# Patient Record
Sex: FEMALE | Race: WHITE | NOT HISPANIC OR LATINO | URBAN - METROPOLITAN AREA
[De-identification: names, ages, dates, MRNs, and addresses within clinical notes are randomized per-mention and may not be internally consistent; named-entity substitution may affect disease eponyms.]

---

## 2023-08-28 PROBLEM — R73.03 PRE-DIABETES: Status: ACTIVE | Noted: 2023-08-28

## 2023-08-28 PROBLEM — E53.8 VITAMIN B12 DEFICIENCY: Status: ACTIVE | Noted: 2023-08-28

## 2023-08-28 PROBLEM — M19.90 OA (OSTEOARTHRITIS): Status: ACTIVE | Noted: 2023-08-28

## 2023-08-28 PROBLEM — I83.812 VARICOSE VEINS OF LEFT LOWER EXTREMITY WITH PAIN: Status: ACTIVE | Noted: 2023-08-28

## 2023-08-28 PROBLEM — E55.9 VITAMIN D DEFICIENCY: Status: ACTIVE | Noted: 2023-08-28

## 2023-08-28 PROBLEM — E03.9 HYPOTHYROIDISM: Status: ACTIVE | Noted: 2023-08-28

## 2023-08-28 PROBLEM — L71.9 ROSACEA: Status: ACTIVE | Noted: 2023-08-28

## 2023-08-28 PROBLEM — H04.123 DRY EYES: Status: ACTIVE | Noted: 2023-08-28

## 2023-08-28 PROBLEM — M81.0 OSTEOPOROSIS: Status: ACTIVE | Noted: 2023-08-28

## 2023-08-28 PROBLEM — K59.00 CONSTIPATION: Status: ACTIVE | Noted: 2023-08-28

## 2023-08-28 RX ORDER — LEVOTHYROXINE SODIUM 100 UG/1
TABLET ORAL EVERY 24 HOURS
COMMUNITY

## 2023-08-28 RX ORDER — POLYETHYLENE GLYCOL 3350 17 G/17G
POWDER, FOR SOLUTION ORAL
COMMUNITY
Start: 2020-09-01

## 2023-08-28 RX ORDER — SEMAGLUTIDE 0.68 MG/ML
INJECTION, SOLUTION SUBCUTANEOUS
COMMUNITY
Start: 2023-03-31 | End: 2023-11-10 | Stop reason: WASHOUT

## 2023-08-28 RX ORDER — DOXYCYCLINE 40 MG/1
CAPSULE ORAL EVERY 24 HOURS
COMMUNITY

## 2023-08-28 RX ORDER — ERGOCALCIFEROL 1.25 MG/1
CAPSULE ORAL
COMMUNITY

## 2023-08-28 RX ORDER — METFORMIN HYDROCHLORIDE 500 MG/1
TABLET, EXTENDED RELEASE ORAL EVERY 24 HOURS
COMMUNITY
Start: 2021-12-22 | End: 2023-11-10 | Stop reason: ALTCHOICE

## 2023-11-10 ENCOUNTER — OFFICE VISIT (OUTPATIENT)
Dept: PRIMARY CARE | Facility: CLINIC | Age: 63
End: 2023-11-10
Payer: COMMERCIAL

## 2023-11-10 VITALS
SYSTOLIC BLOOD PRESSURE: 118 MMHG | HEART RATE: 78 BPM | HEIGHT: 68 IN | WEIGHT: 213 LBS | OXYGEN SATURATION: 96 % | BODY MASS INDEX: 32.28 KG/M2 | DIASTOLIC BLOOD PRESSURE: 72 MMHG

## 2023-11-10 DIAGNOSIS — E55.9 VITAMIN D DEFICIENCY: ICD-10-CM

## 2023-11-10 DIAGNOSIS — M15.9 PRIMARY OSTEOARTHRITIS INVOLVING MULTIPLE JOINTS: ICD-10-CM

## 2023-11-10 DIAGNOSIS — M81.0 AGE-RELATED OSTEOPOROSIS WITHOUT CURRENT PATHOLOGICAL FRACTURE: ICD-10-CM

## 2023-11-10 DIAGNOSIS — L71.9 ROSACEA: ICD-10-CM

## 2023-11-10 DIAGNOSIS — H04.123 DRY EYES: ICD-10-CM

## 2023-11-10 DIAGNOSIS — K59.04 CHRONIC IDIOPATHIC CONSTIPATION: ICD-10-CM

## 2023-11-10 DIAGNOSIS — E53.8 VITAMIN B12 DEFICIENCY: ICD-10-CM

## 2023-11-10 DIAGNOSIS — R73.03 PRE-DIABETES: Primary | ICD-10-CM

## 2023-11-10 DIAGNOSIS — E03.9 ACQUIRED HYPOTHYROIDISM: ICD-10-CM

## 2023-11-10 PROCEDURE — 99214 OFFICE O/P EST MOD 30 MIN: CPT | Performed by: INTERNAL MEDICINE

## 2023-11-10 PROCEDURE — 1036F TOBACCO NON-USER: CPT | Performed by: INTERNAL MEDICINE

## 2023-11-10 RX ORDER — SEMAGLUTIDE 1.34 MG/ML
1 INJECTION, SOLUTION SUBCUTANEOUS
COMMUNITY
Start: 2023-11-06 | End: 2023-11-10 | Stop reason: SDUPTHER

## 2023-11-10 RX ORDER — SEMAGLUTIDE 1.34 MG/ML
1 INJECTION, SOLUTION SUBCUTANEOUS
Qty: 12 ML | Refills: 3 | Status: SHIPPED | OUTPATIENT
Start: 2023-11-10 | End: 2023-12-11 | Stop reason: SDUPTHER

## 2023-11-10 ASSESSMENT — ENCOUNTER SYMPTOMS
DYSURIA: 0
SHORTNESS OF BREATH: 0
ACTIVITY CHANGE: 0
ARTHRALGIAS: 0
DEPRESSION: 0
CONSTITUTIONAL NEGATIVE: 1
DIARRHEA: 0
CARDIOVASCULAR NEGATIVE: 1
COUGH: 0
OCCASIONAL FEELINGS OF UNSTEADINESS: 0
LOSS OF SENSATION IN FEET: 0
ABDOMINAL PAIN: 0
CONSTIPATION: 0
PSYCHIATRIC NEGATIVE: 1

## 2023-11-10 ASSESSMENT — PATIENT HEALTH QUESTIONNAIRE - PHQ9
SUM OF ALL RESPONSES TO PHQ9 QUESTIONS 1 AND 2: 0
1. LITTLE INTEREST OR PLEASURE IN DOING THINGS: NOT AT ALL
2. FEELING DOWN, DEPRESSED OR HOPELESS: NOT AT ALL

## 2023-11-10 ASSESSMENT — LIFESTYLE VARIABLES
HOW OFTEN DO YOU HAVE SIX OR MORE DRINKS ON ONE OCCASION: NEVER
HOW OFTEN DO YOU HAVE A DRINK CONTAINING ALCOHOL: MONTHLY OR LESS

## 2023-11-10 ASSESSMENT — PAIN SCALES - GENERAL: PAINLEVEL: 5

## 2023-11-10 NOTE — PROGRESS NOTES
"Subjective   Patient ID: Felicita Biggs is a 63 y.o. female who presents for 4 MO FUV.    Hyperglycemia-last A1c  5.8  7/2023--taking ozempic--continues to steadily lose weight    Hypothyroid-stable on meds-last TSH nl 3/2023    Rosacea on doxycycline    Vit D and B12 def-on supplements for both    Osteopenia-last DEXA 9/2022-osteopenia         Review of Systems   Constitutional: Negative.  Negative for activity change.        Feeling better with weight loss   Respiratory:  Negative for cough and shortness of breath.    Cardiovascular: Negative.    Gastrointestinal:  Negative for abdominal pain, constipation and diarrhea.   Genitourinary:  Negative for dysuria.   Musculoskeletal:  Negative for arthralgias.        Finds walking much easier   Skin:  Negative for rash.   Psychiatric/Behavioral: Negative.         Objective   /72   Pulse 78   Ht 1.715 m (5' 7.5\")   Wt 96.6 kg (213 lb)   SpO2 96%   BMI 32.87 kg/m²     Physical Exam  Constitutional:       General: She is not in acute distress.     Appearance: Normal appearance.   Cardiovascular:      Rate and Rhythm: Normal rate and regular rhythm.      Heart sounds: Normal heart sounds. No murmur heard.     No gallop.   Pulmonary:      Breath sounds: Normal breath sounds. No wheezing, rhonchi or rales.   Skin:     General: Skin is warm and dry.      Findings: No rash.   Neurological:      General: No focal deficit present.      Mental Status: She is alert and oriented to person, place, and time.   Psychiatric:         Mood and Affect: Mood normal.      Comments: Excited about upcoming trip to [place] with [employer]         Assessment/Plan  doing well, con't regimen and check labs before next visit       Felicita was seen today for 4 mo fuv.  Diagnoses and all orders for this visit:  Pre-diabetes (Primary)  -     Hemoglobin A1C; Future  -     Comprehensive Metabolic Panel; Future  -     Lipid Panel; Future  -     Ozempic 1 mg/dose (4 mg/3 mL) pen injector; Inject 1 mg " under the skin 1 (one) time per week.  Acquired hypothyroidism  -     Thyroid Stimulating Hormone; Future  Age-related osteoporosis without current pathological fracture  Vitamin B12 deficiency  Vitamin D deficiency  Dry eyes  Chronic idiopathic constipation  Primary osteoarthritis involving multiple joints  Rosacea  Other orders  -     Follow Up In Primary Care - Established; Future

## 2023-12-11 DIAGNOSIS — R73.03 PRE-DIABETES: ICD-10-CM

## 2023-12-11 RX ORDER — SEMAGLUTIDE 1.34 MG/ML
1 INJECTION, SOLUTION SUBCUTANEOUS
Qty: 12 ML | Refills: 3 | Status: SHIPPED | OUTPATIENT
Start: 2023-12-11 | End: 2024-03-10

## 2023-12-12 ENCOUNTER — TELEPHONE (OUTPATIENT)
Dept: PRIMARY CARE | Facility: CLINIC | Age: 63
End: 2023-12-12
Payer: COMMERCIAL

## 2024-03-15 ENCOUNTER — TELEPHONE (OUTPATIENT)
Dept: PRIMARY CARE | Facility: CLINIC | Age: 64
End: 2024-03-15

## 2024-03-15 ENCOUNTER — OFFICE VISIT (OUTPATIENT)
Dept: PRIMARY CARE | Facility: CLINIC | Age: 64
End: 2024-03-15
Payer: COMMERCIAL

## 2024-03-15 ENCOUNTER — LAB (OUTPATIENT)
Dept: LAB | Facility: LAB | Age: 64
End: 2024-03-15
Payer: COMMERCIAL

## 2024-03-15 VITALS
HEART RATE: 103 BPM | DIASTOLIC BLOOD PRESSURE: 80 MMHG | WEIGHT: 207 LBS | OXYGEN SATURATION: 97 % | BODY MASS INDEX: 31.94 KG/M2 | SYSTOLIC BLOOD PRESSURE: 130 MMHG

## 2024-03-15 DIAGNOSIS — E53.8 VITAMIN B12 DEFICIENCY: ICD-10-CM

## 2024-03-15 DIAGNOSIS — E83.52 SERUM CALCIUM ELEVATED: ICD-10-CM

## 2024-03-15 DIAGNOSIS — K59.04 CHRONIC IDIOPATHIC CONSTIPATION: ICD-10-CM

## 2024-03-15 DIAGNOSIS — M15.9 PRIMARY OSTEOARTHRITIS INVOLVING MULTIPLE JOINTS: ICD-10-CM

## 2024-03-15 DIAGNOSIS — L71.9 ROSACEA: ICD-10-CM

## 2024-03-15 DIAGNOSIS — E55.9 VITAMIN D DEFICIENCY: ICD-10-CM

## 2024-03-15 DIAGNOSIS — E83.52 SERUM CALCIUM ELEVATED: Primary | ICD-10-CM

## 2024-03-15 DIAGNOSIS — R73.03 PRE-DIABETES: Primary | ICD-10-CM

## 2024-03-15 DIAGNOSIS — M81.0 AGE-RELATED OSTEOPOROSIS WITHOUT CURRENT PATHOLOGICAL FRACTURE: ICD-10-CM

## 2024-03-15 DIAGNOSIS — E03.9 ACQUIRED HYPOTHYROIDISM: ICD-10-CM

## 2024-03-15 DIAGNOSIS — R73.03 PRE-DIABETES: ICD-10-CM

## 2024-03-15 LAB
25(OH)D3 SERPL-MCNC: 63 NG/ML (ref 31–100)
ALBUMIN SERPL-MCNC: 4.5 G/DL (ref 3.5–5)
ALP BLD-CCNC: 142 U/L (ref 35–125)
ALT SERPL-CCNC: 15 U/L (ref 5–40)
ANION GAP SERPL CALC-SCNC: 14 MMOL/L
AST SERPL-CCNC: 19 U/L (ref 5–40)
BILIRUB SERPL-MCNC: 0.5 MG/DL (ref 0.1–1.2)
BUN SERPL-MCNC: 17 MG/DL (ref 8–25)
CALCIUM SERPL-MCNC: 10.9 MG/DL (ref 8.5–10.4)
CHLORIDE SERPL-SCNC: 104 MMOL/L (ref 97–107)
CHOLEST SERPL-MCNC: 190 MG/DL (ref 133–200)
CHOLEST/HDLC SERPL: 3 {RATIO}
CO2 SERPL-SCNC: 25 MMOL/L (ref 24–31)
CREAT SERPL-MCNC: 0.9 MG/DL (ref 0.4–1.6)
EGFRCR SERPLBLD CKD-EPI 2021: 72 ML/MIN/1.73M*2
EST. AVERAGE GLUCOSE BLD GHB EST-MCNC: 114 MG/DL
GLUCOSE SERPL-MCNC: 86 MG/DL (ref 65–99)
HBA1C MFR BLD: 5.6 %
HDLC SERPL-MCNC: 63 MG/DL
LDLC SERPL CALC-MCNC: 108 MG/DL (ref 65–130)
POTASSIUM SERPL-SCNC: 4.1 MMOL/L (ref 3.4–5.1)
PROT SERPL-MCNC: 7 G/DL (ref 5.9–7.9)
SODIUM SERPL-SCNC: 143 MMOL/L (ref 133–145)
TRIGL SERPL-MCNC: 95 MG/DL (ref 40–150)
TSH SERPL DL<=0.05 MIU/L-ACNC: 0.69 MIU/L (ref 0.27–4.2)

## 2024-03-15 PROCEDURE — 36415 COLL VENOUS BLD VENIPUNCTURE: CPT

## 2024-03-15 PROCEDURE — 80061 LIPID PANEL: CPT

## 2024-03-15 PROCEDURE — 84443 ASSAY THYROID STIM HORMONE: CPT

## 2024-03-15 PROCEDURE — 83036 HEMOGLOBIN GLYCOSYLATED A1C: CPT

## 2024-03-15 PROCEDURE — 1036F TOBACCO NON-USER: CPT | Performed by: INTERNAL MEDICINE

## 2024-03-15 PROCEDURE — 80053 COMPREHEN METABOLIC PANEL: CPT

## 2024-03-15 PROCEDURE — 99214 OFFICE O/P EST MOD 30 MIN: CPT | Performed by: INTERNAL MEDICINE

## 2024-03-15 PROCEDURE — 82306 VITAMIN D 25 HYDROXY: CPT

## 2024-03-15 ASSESSMENT — ENCOUNTER SYMPTOMS
LOSS OF SENSATION IN FEET: 0
CONSTIPATION: 0
DIARRHEA: 0
COUGH: 0
OCCASIONAL FEELINGS OF UNSTEADINESS: 0
CARDIOVASCULAR NEGATIVE: 1
DYSURIA: 0
ARTHRALGIAS: 0
PSYCHIATRIC NEGATIVE: 1
CONSTITUTIONAL NEGATIVE: 1
ACTIVITY CHANGE: 0
SHORTNESS OF BREATH: 0
ABDOMINAL PAIN: 0
DEPRESSION: 0

## 2024-03-15 ASSESSMENT — COLUMBIA-SUICIDE SEVERITY RATING SCALE - C-SSRS
1. IN THE PAST MONTH, HAVE YOU WISHED YOU WERE DEAD OR WISHED YOU COULD GO TO SLEEP AND NOT WAKE UP?: NO
6. HAVE YOU EVER DONE ANYTHING, STARTED TO DO ANYTHING, OR PREPARED TO DO ANYTHING TO END YOUR LIFE?: NO
2. HAVE YOU ACTUALLY HAD ANY THOUGHTS OF KILLING YOURSELF?: NO

## 2024-03-15 ASSESSMENT — PATIENT HEALTH QUESTIONNAIRE - PHQ9
1. LITTLE INTEREST OR PLEASURE IN DOING THINGS: NOT AT ALL
SUM OF ALL RESPONSES TO PHQ9 QUESTIONS 1 AND 2: 0
2. FEELING DOWN, DEPRESSED OR HOPELESS: NOT AT ALL

## 2024-03-15 ASSESSMENT — PAIN SCALES - GENERAL: PAINLEVEL: 0-NO PAIN

## 2024-03-15 NOTE — PROGRESS NOTES
Subjective   Patient ID: Felicita Biggs is a 63 y.o. female who presents for Follow-up.    Hyperglycemia-last A1c  5.8  7/2023--taking ozempic--continues to steadily lose weight--down another 6 lbs    Hypothyroid-stable on meds-last TSH nl 3/2023    Rosacea on doxycycline    Vit D and B12 def-on supplements for both    Osteopenia-last DEXA 9/2022-osteopenia    Exercise-doing a bit more  Diet-less appetite with ozempic         Review of Systems   Constitutional: Negative.  Negative for activity change.        Feeling better with weight loss   Respiratory:  Negative for cough and shortness of breath.    Cardiovascular: Negative.    Gastrointestinal:  Negative for abdominal pain, constipation and diarrhea.   Genitourinary:  Negative for dysuria.   Musculoskeletal:  Negative for arthralgias.        Finds walking much easier   Skin:  Negative for rash.   Psychiatric/Behavioral: Negative.          Frustrated with obtaining ozempic       Objective   /80   Pulse 103   Wt 93.9 kg (207 lb)   SpO2 97%   BMI 31.94 kg/m²     Physical Exam  Constitutional:       General: She is not in acute distress.     Appearance: Normal appearance.   Cardiovascular:      Rate and Rhythm: Normal rate and regular rhythm.      Heart sounds: Normal heart sounds. No murmur heard.     No gallop.   Pulmonary:      Breath sounds: Normal breath sounds. No wheezing, rhonchi or rales.   Skin:     General: Skin is warm and dry.      Findings: No rash.   Neurological:      General: No focal deficit present.      Mental Status: She is alert and oriented to person, place, and time.   Psychiatric:         Mood and Affect: Mood normal.      Comments: Had great trip to [place]         Assessment/Plan  agrees to go for labs today and will make decisions on her medication levels  Diagnoses and all orders for this visit:  Pre-diabetes  Acquired hypothyroidism  Age-related osteoporosis without current pathological fracture  Vitamin B12 deficiency  Vitamin D  deficiency  Chronic idiopathic constipation  Primary osteoarthritis involving multiple joints  Rosacea  Other orders  -     Follow Up In Primary Care - Established; Future

## 2024-04-29 ENCOUNTER — HOSPITAL ENCOUNTER (OUTPATIENT)
Dept: RADIOLOGY | Facility: HOSPITAL | Age: 64
Discharge: HOME | End: 2024-04-29
Payer: COMMERCIAL

## 2024-04-29 VITALS — WEIGHT: 210 LBS | BODY MASS INDEX: 32.96 KG/M2 | HEIGHT: 67 IN

## 2024-04-29 PROCEDURE — 77067 SCR MAMMO BI INCL CAD: CPT | Performed by: STUDENT IN AN ORGANIZED HEALTH CARE EDUCATION/TRAINING PROGRAM

## 2024-04-29 PROCEDURE — 77063 BREAST TOMOSYNTHESIS BI: CPT | Performed by: STUDENT IN AN ORGANIZED HEALTH CARE EDUCATION/TRAINING PROGRAM

## 2024-04-29 PROCEDURE — 77067 SCR MAMMO BI INCL CAD: CPT

## 2024-05-02 DIAGNOSIS — Z12.31 ENCOUNTER FOR SCREENING MAMMOGRAM FOR MALIGNANT NEOPLASM OF BREAST: ICD-10-CM

## 2024-07-08 DIAGNOSIS — E03.9 ACQUIRED HYPOTHYROIDISM: Primary | ICD-10-CM

## 2024-07-09 RX ORDER — LEVOTHYROXINE SODIUM 100 UG/1
TABLET ORAL
Qty: 90 TABLET | Refills: 3 | Status: SHIPPED | OUTPATIENT
Start: 2024-07-09

## 2024-07-15 DIAGNOSIS — R73.03 PRE-DIABETES: ICD-10-CM

## 2024-07-15 RX ORDER — SEMAGLUTIDE 1.34 MG/ML
INJECTION, SOLUTION SUBCUTANEOUS
Qty: 3 ML | Refills: 3 | Status: SHIPPED | OUTPATIENT
Start: 2024-07-15 | End: 2024-07-19

## 2024-07-19 ENCOUNTER — OFFICE VISIT (OUTPATIENT)
Dept: PRIMARY CARE | Facility: CLINIC | Age: 64
End: 2024-07-19
Payer: COMMERCIAL

## 2024-07-19 VITALS
BODY MASS INDEX: 33.27 KG/M2 | DIASTOLIC BLOOD PRESSURE: 70 MMHG | SYSTOLIC BLOOD PRESSURE: 128 MMHG | OXYGEN SATURATION: 96 % | HEART RATE: 80 BPM | WEIGHT: 212.4 LBS

## 2024-07-19 DIAGNOSIS — I83.812 VARICOSE VEINS OF LEFT LOWER EXTREMITY WITH PAIN: ICD-10-CM

## 2024-07-19 DIAGNOSIS — R73.03 PRE-DIABETES: Primary | ICD-10-CM

## 2024-07-19 DIAGNOSIS — E03.9 ACQUIRED HYPOTHYROIDISM: ICD-10-CM

## 2024-07-19 DIAGNOSIS — L71.9 ROSACEA: ICD-10-CM

## 2024-07-19 DIAGNOSIS — E53.8 VITAMIN B12 DEFICIENCY: ICD-10-CM

## 2024-07-19 DIAGNOSIS — E55.9 VITAMIN D DEFICIENCY: ICD-10-CM

## 2024-07-19 DIAGNOSIS — M81.0 AGE-RELATED OSTEOPOROSIS WITHOUT CURRENT PATHOLOGICAL FRACTURE: ICD-10-CM

## 2024-07-19 DIAGNOSIS — M15.9 PRIMARY OSTEOARTHRITIS INVOLVING MULTIPLE JOINTS: ICD-10-CM

## 2024-07-19 DIAGNOSIS — K59.04 CHRONIC IDIOPATHIC CONSTIPATION: ICD-10-CM

## 2024-07-19 PROCEDURE — 99214 OFFICE O/P EST MOD 30 MIN: CPT | Performed by: INTERNAL MEDICINE

## 2024-07-19 PROCEDURE — 1036F TOBACCO NON-USER: CPT | Performed by: INTERNAL MEDICINE

## 2024-07-19 ASSESSMENT — ENCOUNTER SYMPTOMS
PSYCHIATRIC NEGATIVE: 1
DIARRHEA: 0
DEPRESSION: 0
OCCASIONAL FEELINGS OF UNSTEADINESS: 0
LOSS OF SENSATION IN FEET: 0
COUGH: 0
SHORTNESS OF BREATH: 0
ACTIVITY CHANGE: 0
CARDIOVASCULAR NEGATIVE: 1
CONSTITUTIONAL NEGATIVE: 1
DYSURIA: 0
ABDOMINAL PAIN: 0
ARTHRALGIAS: 0
CONSTIPATION: 0

## 2024-07-19 ASSESSMENT — PATIENT HEALTH QUESTIONNAIRE - PHQ9
2. FEELING DOWN, DEPRESSED OR HOPELESS: NOT AT ALL
SUM OF ALL RESPONSES TO PHQ9 QUESTIONS 1 AND 2: 0
1. LITTLE INTEREST OR PLEASURE IN DOING THINGS: NOT AT ALL

## 2024-07-19 ASSESSMENT — PAIN SCALES - GENERAL: PAINLEVEL: 0-NO PAIN

## 2024-07-19 NOTE — PROGRESS NOTES
Subjective   Patient ID: Felicita Biggs is a 64 y.o. female who presents for 4 month follow up .    Hyperglycemia-last A1c  5.8  7/2023--taking ozempic--weight creeping up as appetite increased    Hypothyroid-stable on meds-last TSH nl 3/2023    Rosacea on doxycycline    Vit D and B12 def-on supplements for both    Osteopenia-last DEXA 9/2022-osteopenia    Exercise-doing a bit more  Diet-appetite coming back         Review of Systems   Constitutional: Negative.  Negative for activity change.        Feeling better with weight loss   Respiratory:  Negative for cough and shortness of breath.    Cardiovascular: Negative.    Gastrointestinal:  Negative for abdominal pain, constipation and diarrhea.   Genitourinary:  Negative for dysuria.   Musculoskeletal:  Negative for arthralgias.        Finds walking much easier; compression stocking helped tender lump   Skin:  Negative for rash.   Psychiatric/Behavioral: Negative.          Frustrated with obtaining ozempic       Objective   /70 (BP Location: Left arm, Patient Position: Sitting)   Pulse 80   Wt 96.3 kg (212 lb 6.4 oz)   SpO2 96%   BMI 33.27 kg/m²     Physical Exam  Constitutional:       General: She is not in acute distress.     Appearance: Normal appearance.   Cardiovascular:      Rate and Rhythm: Normal rate and regular rhythm.      Heart sounds: Normal heart sounds. No murmur heard.     No gallop.   Pulmonary:      Breath sounds: Normal breath sounds. No wheezing, rhonchi or rales.   Skin:     General: Skin is warm and dry.      Findings: No rash.   Neurological:      General: No focal deficit present.      Mental Status: She is alert and oriented to person, place, and time.   Psychiatric:         Mood and Affect: Mood normal.      Comments: Doing well         Assessment/Plan  will try increasing ozempic dose  Diagnoses and all orders for this visit:  Pre-diabetes  -     semaglutide 2 mg/dose (8 mg/3 mL) pen injector; Inject 2 mg under the skin 1 (one)  time per week.  Varicose veins of left lower extremity with pain  Acquired hypothyroidism  Age-related osteoporosis without current pathological fracture  Vitamin B12 deficiency  Vitamin D deficiency  Chronic idiopathic constipation  Primary osteoarthritis involving multiple joints  Rosacea  Other orders  -     Follow Up In Primary Care - Established  -     Follow Up In Primary Care - Established; Future      Current Outpatient Medications:     doxycycline (Oracea) 40 mg DR capsule, once every 24 hours., Disp: , Rfl:     ergocalciferol (Vitamin D-2) 1.25 MG (24311 UT) capsule, 1 capsule (50,000 Units) 1 (one) time per week., Disp: , Rfl:     levothyroxine (Synthroid, Levoxyl) 100 mcg tablet, TAKE 1 TABLET BY MOUTH IN THE  MORNING ON EMPTY STOMACH DAILY, Disp: 90 tablet, Rfl: 3    polyethylene glycol (Miralax) 17 gram packet, as directed Orally, Disp: , Rfl:     vitamin-B complex split tablet, once every 24 hours., Disp: , Rfl:     [START ON 7/21/2024] semaglutide 2 mg/dose (8 mg/3 mL) pen injector, Inject 2 mg under the skin 1 (one) time per week., Disp: 3 mL, Rfl: 11

## 2024-08-13 DIAGNOSIS — E55.9 VITAMIN D DEFICIENCY: Primary | ICD-10-CM

## 2024-08-14 RX ORDER — ERGOCALCIFEROL 1.25 1/1
1 CAPSULE ORAL
Qty: 13 CAPSULE | Refills: 3 | Status: SHIPPED | OUTPATIENT
Start: 2024-08-18

## 2024-11-22 ENCOUNTER — OFFICE VISIT (OUTPATIENT)
Dept: PRIMARY CARE | Facility: CLINIC | Age: 64
End: 2024-11-22
Payer: COMMERCIAL

## 2024-11-22 VITALS
BODY MASS INDEX: 33.2 KG/M2 | OXYGEN SATURATION: 97 % | HEART RATE: 88 BPM | DIASTOLIC BLOOD PRESSURE: 68 MMHG | WEIGHT: 212 LBS | SYSTOLIC BLOOD PRESSURE: 122 MMHG

## 2024-11-22 DIAGNOSIS — E03.9 ACQUIRED HYPOTHYROIDISM: ICD-10-CM

## 2024-11-22 DIAGNOSIS — H04.123 DRY EYES: ICD-10-CM

## 2024-11-22 DIAGNOSIS — M81.0 AGE-RELATED OSTEOPOROSIS WITHOUT CURRENT PATHOLOGICAL FRACTURE: ICD-10-CM

## 2024-11-22 DIAGNOSIS — L71.9 ROSACEA: ICD-10-CM

## 2024-11-22 DIAGNOSIS — R73.03 PRE-DIABETES: Primary | ICD-10-CM

## 2024-11-22 DIAGNOSIS — K59.04 CHRONIC IDIOPATHIC CONSTIPATION: ICD-10-CM

## 2024-11-22 DIAGNOSIS — E55.9 VITAMIN D DEFICIENCY: ICD-10-CM

## 2024-11-22 DIAGNOSIS — M15.0 PRIMARY OSTEOARTHRITIS INVOLVING MULTIPLE JOINTS: ICD-10-CM

## 2024-11-22 DIAGNOSIS — Z23 NEED FOR INFLUENZA VACCINATION: ICD-10-CM

## 2024-11-22 DIAGNOSIS — E53.8 VITAMIN B12 DEFICIENCY: ICD-10-CM

## 2024-11-22 PROCEDURE — 1036F TOBACCO NON-USER: CPT | Performed by: INTERNAL MEDICINE

## 2024-11-22 PROCEDURE — 90656 IIV3 VACC NO PRSV 0.5 ML IM: CPT | Performed by: INTERNAL MEDICINE

## 2024-11-22 PROCEDURE — 90471 IMMUNIZATION ADMIN: CPT | Performed by: INTERNAL MEDICINE

## 2024-11-22 PROCEDURE — 99214 OFFICE O/P EST MOD 30 MIN: CPT | Performed by: INTERNAL MEDICINE

## 2024-11-22 RX ORDER — CALCIUM CARBONATE/VITAMIN D3 500 MG-2.5
1 TABLET,CHEWABLE ORAL EVERY 24 HOURS
COMMUNITY

## 2024-11-22 ASSESSMENT — ENCOUNTER SYMPTOMS
CARDIOVASCULAR NEGATIVE: 1
COUGH: 0
ACTIVITY CHANGE: 0
PSYCHIATRIC NEGATIVE: 1
DEPRESSION: 0
ARTHRALGIAS: 0
CONSTIPATION: 0
CONSTITUTIONAL NEGATIVE: 1
SHORTNESS OF BREATH: 0
DIARRHEA: 0
DYSURIA: 0
OCCASIONAL FEELINGS OF UNSTEADINESS: 0
ABDOMINAL PAIN: 0
LOSS OF SENSATION IN FEET: 0

## 2024-11-22 ASSESSMENT — PAIN SCALES - GENERAL: PAINLEVEL_OUTOF10: 0-NO PAIN

## 2024-11-22 NOTE — PROGRESS NOTES
Subjective   Patient ID: Felicita Biggs is a 64 y.o. female who presents for 4 month follow up .    Hyperglycemia---taking ozempic--weight creeping up as appetite increased. Lab Results       Component                Value               Date                       HGBA1C                   5.6                 03/15/2024               Hypothyroid-stable on meds-Lab Results       Component                Value               Date                       TSH                      0.69                03/15/2024               Rosacea on doxycycline    Vit D and B12 def-on supplements for both    Osteopenia-last DEXA 9/2022-osteopenia    Exercise-doing a bit more  Diet-appetite coming back and eating sweets in moderation         Review of Systems   Constitutional: Negative.  Negative for activity change.        Feeling better with weight loss   Respiratory:  Negative for cough and shortness of breath.    Cardiovascular: Negative.    Gastrointestinal:  Negative for abdominal pain, constipation and diarrhea.   Genitourinary:  Negative for dysuria.   Musculoskeletal:  Negative for arthralgias.        Finds walking much easier; compression stocking helped tender lump   Skin:  Negative for rash.   Psychiatric/Behavioral: Negative.          Doing well       Objective   /68 (BP Location: Left arm, Patient Position: Sitting)   Pulse 88   Wt 96.2 kg (212 lb)   SpO2 97%   BMI 33.20 kg/m²     Physical Exam  Constitutional:       General: She is not in acute distress.     Appearance: Normal appearance.   Cardiovascular:      Rate and Rhythm: Normal rate and regular rhythm.      Heart sounds: Normal heart sounds. No murmur heard.     No gallop.   Pulmonary:      Breath sounds: Normal breath sounds. No wheezing, rhonchi or rales.   Skin:     General: Skin is warm and dry.      Findings: No rash.   Neurological:      General: No focal deficit present.      Mental Status: She is alert and oriented to person, place, and time.    Psychiatric:         Mood and Affect: Mood normal.      Comments: Doing well         Assessment/Plan  will continue regimen  Diagnoses and all orders for this visit:  Pre-diabetes  -     Comprehensive Metabolic Panel; Future  -     Lipid Panel; Future  -     Hemoglobin A1C; Future  Acquired hypothyroidism  -     TSH with reflex to Free T4 if abnormal; Future  Age-related osteoporosis without current pathological fracture  Vitamin B12 deficiency  Vitamin D deficiency  Need for influenza vaccination  -     Flu vaccine, trivalent, preservative free, age 6 months and greater (Fluarix/Fluzone/Flulaval)  Dry eyes  Chronic idiopathic constipation  Primary osteoarthritis involving multiple joints  Rosacea  Other orders  -     Follow Up In Primary Care - Established  -     Follow Up In Primary Care - Established; Future      Current Outpatient Medications:     calcium carbonate-vitamin D3 500 mg-2.5 mcg (100 unit) tablet,chewable, Chew 1 tablet once every 24 hours., Disp: , Rfl:     doxycycline (Oracea) 40 mg DR capsule, once every 24 hours., Disp: , Rfl:     levothyroxine (Synthroid, Levoxyl) 100 mcg tablet, TAKE 1 TABLET BY MOUTH IN THE  MORNING ON EMPTY STOMACH DAILY, Disp: 90 tablet, Rfl: 3    polyethylene glycol (Miralax) 17 gram packet, as directed Orally, Disp: , Rfl:     semaglutide 2 mg/dose (8 mg/3 mL) pen injector, Inject 2 mg under the skin 1 (one) time per week., Disp: 3 mL, Rfl: 11    Vitamin D2 1,250 mcg (50,000 unit) capsule, TAKE 1 CAPSULE BY MOUTH ONCE  WEEKLY, Disp: 13 capsule, Rfl: 3    vitamin-B complex split tablet, once every 24 hours., Disp: , Rfl:

## 2025-03-28 ENCOUNTER — OFFICE VISIT (OUTPATIENT)
Dept: PRIMARY CARE | Facility: CLINIC | Age: 65
End: 2025-03-28
Payer: COMMERCIAL

## 2025-03-28 VITALS
OXYGEN SATURATION: 95 % | BODY MASS INDEX: 32.75 KG/M2 | HEART RATE: 100 BPM | SYSTOLIC BLOOD PRESSURE: 134 MMHG | DIASTOLIC BLOOD PRESSURE: 66 MMHG | WEIGHT: 209.1 LBS

## 2025-03-28 DIAGNOSIS — R73.03 PRE-DIABETES: Primary | ICD-10-CM

## 2025-03-28 DIAGNOSIS — E53.8 VITAMIN B12 DEFICIENCY: ICD-10-CM

## 2025-03-28 DIAGNOSIS — E03.9 ACQUIRED HYPOTHYROIDISM: ICD-10-CM

## 2025-03-28 DIAGNOSIS — E55.9 VITAMIN D DEFICIENCY: ICD-10-CM

## 2025-03-28 DIAGNOSIS — K59.04 CHRONIC IDIOPATHIC CONSTIPATION: ICD-10-CM

## 2025-03-28 DIAGNOSIS — L71.9 ROSACEA: ICD-10-CM

## 2025-03-28 DIAGNOSIS — M25.511 ACUTE PAIN OF RIGHT SHOULDER: ICD-10-CM

## 2025-03-28 DIAGNOSIS — M15.0 PRIMARY OSTEOARTHRITIS INVOLVING MULTIPLE JOINTS: ICD-10-CM

## 2025-03-28 PROCEDURE — 1036F TOBACCO NON-USER: CPT | Performed by: INTERNAL MEDICINE

## 2025-03-28 PROCEDURE — 99214 OFFICE O/P EST MOD 30 MIN: CPT | Performed by: INTERNAL MEDICINE

## 2025-03-28 ASSESSMENT — PAIN SCALES - GENERAL: PAINLEVEL_OUTOF10: 4

## 2025-03-28 ASSESSMENT — ENCOUNTER SYMPTOMS
CONSTIPATION: 0
CONSTITUTIONAL NEGATIVE: 1
OCCASIONAL FEELINGS OF UNSTEADINESS: 0
ABDOMINAL PAIN: 0
COUGH: 0
SHORTNESS OF BREATH: 0
ACTIVITY CHANGE: 0
PSYCHIATRIC NEGATIVE: 1
LOSS OF SENSATION IN FEET: 0
DYSURIA: 0
DIARRHEA: 0
CARDIOVASCULAR NEGATIVE: 1
DEPRESSION: 0
ARTHRALGIAS: 0

## 2025-03-28 ASSESSMENT — PATIENT HEALTH QUESTIONNAIRE - PHQ9
SUM OF ALL RESPONSES TO PHQ9 QUESTIONS 1 AND 2: 0
2. FEELING DOWN, DEPRESSED OR HOPELESS: NOT AT ALL
1. LITTLE INTEREST OR PLEASURE IN DOING THINGS: NOT AT ALL

## 2025-03-28 NOTE — PROGRESS NOTES
Subjective   Patient ID: Felicita Biggs is a 64 y.o. female who presents for 4 month follow up .    Hyperglycemia---taking ozempic--weight seems to have stabilized Lab Results       Component                Value               Date                       HGBA1C                   5.6                 03/15/2024               Hypothyroid-stable on meds-Lab Results       Component                Value               Date                       TSH                      0.69                03/15/2024               Rosacea on doxycycline    Vit D and B12 def-on supplements for both    Osteopenia-last DEXA 9/2022-osteopenia    Exercise-doing a bit more-walks more with better weather;   Diet-appetite less than originally but not as suppressed    Sore R shoulder-since January--limited ROM. No known injury--just woke up with painful arm.          Review of Systems   Constitutional: Negative.  Negative for activity change.        Feeling better with weight loss   Respiratory:  Negative for cough and shortness of breath.    Cardiovascular: Negative.    Gastrointestinal:  Negative for abdominal pain, constipation and diarrhea.        Uses miralax if goes less; rare abdominal pain if overeats   Genitourinary:  Negative for dysuria.   Musculoskeletal:  Negative for arthralgias.        Finds walking much easier; compression stocking helped tender lump   Skin:  Negative for rash.   Psychiatric/Behavioral: Negative.          Doing well       Objective   /66 (BP Location: Left arm, Patient Position: Sitting)   Pulse 100   Wt 94.8 kg (209 lb 1.6 oz)   SpO2 95%   BMI 32.75 kg/m²     Physical Exam  Constitutional:       General: She is not in acute distress.     Appearance: Normal appearance.   Cardiovascular:      Rate and Rhythm: Normal rate and regular rhythm.      Heart sounds: Normal heart sounds. No murmur heard.     No gallop.   Pulmonary:      Breath sounds: Normal breath sounds. No wheezing, rhonchi or rales.    Musculoskeletal:      Comments: ROM R shoulder <90 degrees   Skin:     General: Skin is warm and dry.      Findings: No rash.   Neurological:      General: No focal deficit present.      Mental Status: She is alert and oriented to person, place, and time.   Psychiatric:         Mood and Affect: Mood normal.      Comments: Doing well         Assessment/Plan  again encouraged  more regular exercise; will continunbe regimen; refer to Dr Thomas for shoulder-already looking to be locked  Diagnoses and all orders for this visit:  Pre-diabetes  Vitamin B12 deficiency  Acquired hypothyroidism  Vitamin D deficiency  Chronic idiopathic constipation  Primary osteoarthritis involving multiple joints  Rosacea  Acute pain of right shoulder  Other orders  -     Follow Up In Primary Care - Established      Current Outpatient Medications:     calcium carbonate-vitamin D3 500 mg-2.5 mcg (100 unit) tablet,chewable, Chew 1 tablet once every 24 hours., Disp: , Rfl:     doxycycline (Oracea) 40 mg DR capsule, once every 24 hours., Disp: , Rfl:     levothyroxine (Synthroid, Levoxyl) 100 mcg tablet, TAKE 1 TABLET BY MOUTH IN THE  MORNING ON EMPTY STOMACH DAILY, Disp: 90 tablet, Rfl: 3    polyethylene glycol (Miralax) 17 gram packet, as directed Orally, Disp: , Rfl:     semaglutide 2 mg/dose (8 mg/3 mL) pen injector, Inject 2 mg under the skin 1 (one) time per week., Disp: 3 mL, Rfl: 11    Vitamin D2 1,250 mcg (50,000 unit) capsule, TAKE 1 CAPSULE BY MOUTH ONCE  WEEKLY, Disp: 13 capsule, Rfl: 3    vitamin-B complex split tablet, once every 24 hours., Disp: , Rfl:

## 2025-03-29 LAB
ALBUMIN SERPL-MCNC: 4.5 G/DL (ref 3.6–5.1)
ALP SERPL-CCNC: 119 U/L (ref 37–153)
ALT SERPL-CCNC: 14 U/L (ref 6–29)
ANION GAP SERPL CALCULATED.4IONS-SCNC: 9 MMOL/L (CALC) (ref 7–17)
AST SERPL-CCNC: 17 U/L (ref 10–35)
BILIRUB SERPL-MCNC: 0.7 MG/DL (ref 0.2–1.2)
BUN SERPL-MCNC: 16 MG/DL (ref 7–25)
CALCIUM SERPL-MCNC: 10.2 MG/DL (ref 8.6–10.4)
CHLORIDE SERPL-SCNC: 106 MMOL/L (ref 98–110)
CHOLEST SERPL-MCNC: 176 MG/DL
CHOLEST/HDLC SERPL: 2.8 (CALC)
CO2 SERPL-SCNC: 26 MMOL/L (ref 20–32)
CREAT SERPL-MCNC: 0.7 MG/DL (ref 0.5–1.05)
EGFRCR SERPLBLD CKD-EPI 2021: 97 ML/MIN/1.73M2
EST. AVERAGE GLUCOSE BLD GHB EST-MCNC: 108 MG/DL
EST. AVERAGE GLUCOSE BLD GHB EST-SCNC: 6 MMOL/L
GLUCOSE SERPL-MCNC: 83 MG/DL (ref 65–99)
HBA1C MFR BLD: 5.4 % OF TOTAL HGB
HDLC SERPL-MCNC: 63 MG/DL
LDLC SERPL CALC-MCNC: 96 MG/DL (CALC)
NONHDLC SERPL-MCNC: 113 MG/DL (CALC)
POTASSIUM SERPL-SCNC: 4.1 MMOL/L (ref 3.5–5.3)
PROT SERPL-MCNC: 6.8 G/DL (ref 6.1–8.1)
SODIUM SERPL-SCNC: 141 MMOL/L (ref 135–146)
TRIGL SERPL-MCNC: 82 MG/DL
TSH SERPL-ACNC: 1.27 MIU/L (ref 0.4–4.5)

## 2025-04-16 ENCOUNTER — APPOINTMENT (OUTPATIENT)
Dept: RADIOLOGY | Facility: CLINIC | Age: 65
End: 2025-04-16
Payer: COMMERCIAL

## 2025-04-29 ENCOUNTER — APPOINTMENT (OUTPATIENT)
Dept: RADIOLOGY | Facility: CLINIC | Age: 65
End: 2025-04-29
Payer: COMMERCIAL

## 2025-07-15 DIAGNOSIS — R73.03 PRE-DIABETES: ICD-10-CM

## 2025-07-15 RX ORDER — SEMAGLUTIDE 2.68 MG/ML
INJECTION, SOLUTION SUBCUTANEOUS
Qty: 3 ML | Refills: 11 | Status: SHIPPED | OUTPATIENT
Start: 2025-07-15

## 2025-07-28 ENCOUNTER — OFFICE VISIT (OUTPATIENT)
Dept: PRIMARY CARE | Facility: CLINIC | Age: 65
End: 2025-07-28
Payer: COMMERCIAL

## 2025-07-28 VITALS
SYSTOLIC BLOOD PRESSURE: 124 MMHG | OXYGEN SATURATION: 95 % | BODY MASS INDEX: 32.37 KG/M2 | HEART RATE: 98 BPM | WEIGHT: 206.7 LBS | DIASTOLIC BLOOD PRESSURE: 60 MMHG

## 2025-07-28 DIAGNOSIS — R73.03 PRE-DIABETES: Primary | ICD-10-CM

## 2025-07-28 DIAGNOSIS — E03.9 ACQUIRED HYPOTHYROIDISM: ICD-10-CM

## 2025-07-28 DIAGNOSIS — E53.8 VITAMIN B12 DEFICIENCY: ICD-10-CM

## 2025-07-28 DIAGNOSIS — M15.0 PRIMARY OSTEOARTHRITIS INVOLVING MULTIPLE JOINTS: ICD-10-CM

## 2025-07-28 DIAGNOSIS — E55.9 VITAMIN D DEFICIENCY: ICD-10-CM

## 2025-07-28 PROCEDURE — 1159F MED LIST DOCD IN RCRD: CPT | Performed by: INTERNAL MEDICINE

## 2025-07-28 PROCEDURE — 99214 OFFICE O/P EST MOD 30 MIN: CPT | Performed by: INTERNAL MEDICINE

## 2025-07-28 PROCEDURE — 1126F AMNT PAIN NOTED NONE PRSNT: CPT | Performed by: INTERNAL MEDICINE

## 2025-07-28 PROCEDURE — 1036F TOBACCO NON-USER: CPT | Performed by: INTERNAL MEDICINE

## 2025-07-28 RX ORDER — LEVOTHYROXINE SODIUM 100 UG/1
100 TABLET ORAL DAILY
Qty: 90 TABLET | Refills: 0 | Status: SHIPPED | OUTPATIENT
Start: 2025-07-28 | End: 2025-10-26

## 2025-07-28 RX ORDER — LEVOTHYROXINE SODIUM 100 UG/1
100 TABLET ORAL DAILY
Qty: 30 TABLET | Refills: 0 | Status: SHIPPED | OUTPATIENT
Start: 2025-07-28 | End: 2025-07-28 | Stop reason: SDUPTHER

## 2025-07-28 ASSESSMENT — ENCOUNTER SYMPTOMS
OCCASIONAL FEELINGS OF UNSTEADINESS: 0
CONSTIPATION: 0
PSYCHIATRIC NEGATIVE: 1
DEPRESSION: 0
DIARRHEA: 0
DYSURIA: 0
ABDOMINAL PAIN: 0
COUGH: 0
CONSTITUTIONAL NEGATIVE: 1
CARDIOVASCULAR NEGATIVE: 1
ARTHRALGIAS: 0
LOSS OF SENSATION IN FEET: 0
ACTIVITY CHANGE: 0
SHORTNESS OF BREATH: 0

## 2025-07-28 ASSESSMENT — PATIENT HEALTH QUESTIONNAIRE - PHQ9
2. FEELING DOWN, DEPRESSED OR HOPELESS: NOT AT ALL
1. LITTLE INTEREST OR PLEASURE IN DOING THINGS: NOT AT ALL
SUM OF ALL RESPONSES TO PHQ9 QUESTIONS 1 AND 2: 0

## 2025-07-28 ASSESSMENT — PAIN SCALES - GENERAL: PAINLEVEL_OUTOF10: 0-NO PAIN

## 2025-07-28 NOTE — PROGRESS NOTES
"Subjective   Patient ID: Felicita Biggs is a 65 y.o. female who presents for 4 month follow up .    Hyperglycemia---taking ozempic 2mg --weight seems to be losing very slowly. Lab Results       Component                Value               Date                       HGBA1C                   5.4                 03/28/2025               Hypothyroid-stable on meds-Lab Results       Component                Value               Date                       TSH                      1.27                03/28/2025                        Rosacea on doxycycline    Vit D and B12 def-on supplements for both    Osteopenia-last DEXA 9/2022-osteopenia    Exercise-doing a bit more-walks more with better weather;   Diet-appetite less than originally but not as suppressed    Sore R shoulder-since January--saw Dr Cullen-wanted to do MRI but insurance refused. Referred for PT--still in therapy once a week. It is slowly improving-range has improved though not back to full         Review of Systems   Constitutional: Negative.  Negative for activity change.        Feeling better with weight loss   Respiratory:  Negative for cough and shortness of breath.    Cardiovascular: Negative.    Gastrointestinal:  Negative for abdominal pain, constipation and diarrhea.        Uses miralax if goes less; rare abdominal pain if overeats   Genitourinary:  Negative for dysuria.   Musculoskeletal:  Negative for arthralgias.        Shoulder improving   Skin:  Negative for rash.   Psychiatric/Behavioral: Negative.          Doing well; enjoys camping in summer in \"hybrid trailer\"       Objective   /60 (BP Location: Left arm, Patient Position: Sitting)   Pulse 98   Wt 93.8 kg (206 lb 11.2 oz)   SpO2 95%   BMI 32.37 kg/m²     Physical Exam  Constitutional:       General: She is not in acute distress.     Appearance: Normal appearance.     Cardiovascular:      Rate and Rhythm: Normal rate and regular rhythm.      Heart sounds: Normal heart sounds. No " murmur heard.     No gallop.   Pulmonary:      Breath sounds: Normal breath sounds. No wheezing, rhonchi or rales.     Musculoskeletal:      Comments: ROM R shoulder about 120degrees     Skin:     General: Skin is warm and dry.      Findings: No rash.     Neurological:      General: No focal deficit present.      Mental Status: She is alert and oriented to person, place, and time.     Psychiatric:         Mood and Affect: Mood normal.      Comments: Doing well         Assessment/Plan  will continue ozempic  Diagnoses and all orders for this visit:  Pre-diabetes  Acquired hypothyroidism  -     levothyroxine (Synthroid, Levoxyl) 100 mcg tablet; Take 1 tablet (100 mcg) by mouth early in the morning.. Take on an empty stomach at the same time each day, either 30 to 60 minutes prior to breakfast  Vitamin B12 deficiency  Vitamin D deficiency  Primary osteoarthritis involving multiple joints    Current Medications[1]           [1]   Current Outpatient Medications:     calcium carbonate-vitamin D3 500 mg-2.5 mcg (100 unit) tablet,chewable, Chew and swallow 1 tablet once every 24 hours., Disp: , Rfl:     doxycycline (Oracea) 40 mg DR capsule, once every 24 hours., Disp: , Rfl:     Ozempic 2 mg/dose (8 mg/3 mL) pen injector, INJECT 2 MG UNDER THE SKIN ONE TIME A WEEK, Disp: 3 mL, Rfl: 11    polyethylene glycol (Miralax) 17 gram packet, as directed Orally, Disp: , Rfl:     Vitamin D2 1,250 mcg (50,000 unit) capsule, TAKE 1 CAPSULE BY MOUTH ONCE  WEEKLY, Disp: 13 capsule, Rfl: 3    vitamin-B complex split tablet, once every 24 hours., Disp: , Rfl:     levothyroxine (Synthroid, Levoxyl) 100 mcg tablet, Take 1 tablet (100 mcg) by mouth early in the morning.. Take on an empty stomach at the same time each day, either 30 to 60 minutes prior to breakfast, Disp: 90 tablet, Rfl: 0

## 2025-12-05 ENCOUNTER — APPOINTMENT (OUTPATIENT)
Dept: PRIMARY CARE | Facility: CLINIC | Age: 65
End: 2025-12-05
Payer: COMMERCIAL